# Patient Record
Sex: MALE | Race: WHITE | HISPANIC OR LATINO | RURAL
[De-identification: names, ages, dates, MRNs, and addresses within clinical notes are randomized per-mention and may not be internally consistent; named-entity substitution may affect disease eponyms.]

---

## 2018-07-19 ENCOUNTER — OFFICE VISIT (OUTPATIENT)
Dept: RETAIL CLINIC | Facility: CLINIC | Age: 43
End: 2018-07-19

## 2018-07-19 VITALS
TEMPERATURE: 97.2 F | HEART RATE: 82 BPM | OXYGEN SATURATION: 99 % | HEIGHT: 67 IN | BODY MASS INDEX: 22.76 KG/M2 | RESPIRATION RATE: 14 BRPM | WEIGHT: 145 LBS

## 2018-07-19 DIAGNOSIS — S05.01XA ABRASION OF RIGHT CORNEA, INITIAL ENCOUNTER: Primary | ICD-10-CM

## 2018-07-19 DIAGNOSIS — S05.02XA ABRASION OF LEFT CORNEA, INITIAL ENCOUNTER: ICD-10-CM

## 2018-07-19 DIAGNOSIS — J02.9 PHARYNGITIS, UNSPECIFIED ETIOLOGY: ICD-10-CM

## 2018-07-19 PROCEDURE — 99213 OFFICE O/P EST LOW 20 MIN: CPT | Performed by: NURSE PRACTITIONER

## 2018-07-19 RX ORDER — POLYMYXIN B SULFATE AND TRIMETHOPRIM 1; 10000 MG/ML; [USP'U]/ML
1 SOLUTION OPHTHALMIC EVERY 4 HOURS
Qty: 10 ML | Refills: 0 | Status: SHIPPED | OUTPATIENT
Start: 2018-07-19 | End: 2018-07-26

## 2018-07-19 RX ORDER — GUAIFENESIN 600 MG/1
600 TABLET, EXTENDED RELEASE ORAL 2 TIMES DAILY PRN
Qty: 20 TABLET | Refills: 0 | Status: SHIPPED | OUTPATIENT
Start: 2018-07-19 | End: 2018-07-29

## 2018-07-19 NOTE — PROGRESS NOTES
Maurilio Jerry is a 43 y.o. male.     Pt has had a sore throat and red eyes for the last 48 hours.       Sore Throat    This is a new problem. The current episode started yesterday. The problem has been gradually worsening. Neither side of throat is experiencing more pain than the other. There has been no fever. The pain is moderate. Associated symptoms include congestion, coughing and trouble swallowing. Pertinent negatives include no abdominal pain, diarrhea, drooling, ear discharge, ear pain, headaches, hoarse voice, plugged ear sensation, neck pain, shortness of breath, stridor, swollen glands or vomiting. He has had no exposure to strep or mono. He has tried nothing for the symptoms. The treatment provided no relief.        No current outpatient prescriptions on file prior to visit.     No current facility-administered medications on file prior to visit.        No Known Allergies    History reviewed. No pertinent past medical history.    History reviewed. No pertinent surgical history.    History reviewed. No pertinent family history.    Social History     Social History   • Marital status: Single     Spouse name: N/A   • Number of children: N/A   • Years of education: N/A     Occupational History   • Not on file.     Social History Main Topics   • Smoking status: Never Smoker   • Smokeless tobacco: Never Used   • Alcohol use No      Comment: once a week   • Drug use: No   • Sexual activity: Defer     Other Topics Concern   • Not on file     Social History Narrative   • No narrative on file       Review of Systems   Constitutional: Negative for activity change, appetite change, chills, diaphoresis, fatigue and fever.   HENT: Positive for congestion, sore throat and trouble swallowing. Negative for drooling, ear discharge, ear pain, hoarse voice, rhinorrhea, sinus pain, sinus pressure and voice change.    Eyes: Positive for pain, discharge and redness (right).   Respiratory: Positive for cough.  "Negative for chest tightness, shortness of breath and stridor.    Cardiovascular: Negative for chest pain.   Gastrointestinal: Negative for abdominal pain, diarrhea and vomiting.   Musculoskeletal: Negative for arthralgias and neck pain.   Skin: Negative for rash.   Allergic/Immunologic: Negative for environmental allergies.   Neurological: Negative for dizziness and headaches.   Psychiatric/Behavioral: Negative for agitation.       Pulse 82   Temp 97.2 °F (36.2 °C) (Oral)   Resp 14   Ht 170.2 cm (67\")   Wt 65.8 kg (145 lb)   SpO2 99%   BMI 22.71 kg/m²     Objective   Physical Exam   Constitutional: He is oriented to person, place, and time. He appears well-developed and well-nourished. He is cooperative. He appears ill.   HENT:   Head: Normocephalic and atraumatic.   Right Ear: Tympanic membrane, external ear and ear canal normal.   Left Ear: Tympanic membrane, external ear and ear canal normal.   Nose: Nose normal. Right sinus exhibits no maxillary sinus tenderness and no frontal sinus tenderness. Left sinus exhibits no maxillary sinus tenderness and no frontal sinus tenderness.   Mouth/Throat: Mucous membranes are normal. Posterior oropharyngeal erythema present.   Eyes: Conjunctivae and lids are normal.   Cardiovascular: Normal heart sounds.    Pulmonary/Chest: Effort normal and breath sounds normal.   Abdominal: There is no tenderness.   Lymphadenopathy:     He has cervical adenopathy.   Neurological: He is alert and oriented to person, place, and time.   Skin: Skin is warm and dry. No rash noted.   Psychiatric: He has a normal mood and affect. His speech is normal and behavior is normal.       Procedures None    Assessment/Plan   Diagnoses and all orders for this visit:    Abrasion of right cornea, initial encounter  -     trimethoprim-polymyxin b (POLYTRIM) 72574-0.1 UNIT/ML-% ophthalmic solution; Administer 1 drop to both eyes Every 4 (Four) Hours for 7 days.    Abrasion of left cornea, initial " encounter  -     trimethoprim-polymyxin b (POLYTRIM) 32843-4.1 UNIT/ML-% ophthalmic solution; Administer 1 drop to both eyes Every 4 (Four) Hours for 7 days.    Pharyngitis, unspecified etiology  -     guaiFENesin (MUCINEX) 600 MG 12 hr tablet; Take 1 tablet by mouth 2 (Two) Times a Day As Needed for Cough for up to 10 days.        No results found for this or any previous visit.  -Translater utilized for this visit.   Follow up with PCP or go to the nearest emergency room if symptoms worsen or fail to improve.

## 2018-07-19 NOTE — PATIENT INSTRUCTIONS
Abrasión corneal  Corneal Abrasion  Bryan abrasión corneal es un rasguño o lesión en la cubierta transparente en la parte delantera del rosie (córnea). La córnea es bryan especie de cúpula transparente que protege el rosie y ayuda a fijar la vista. La córnea está formada por muchas capas. La capa más superficial es bryan nadira capa de células llamada epitelio corneal. La córnea es juwan de los tejidos más sensibles del cuerpo. Bryan abrasión corneal puede resultar muy dolorosa.  Si no se trata, puede infectarse y producir bryan úlcera. [place] puede dejar cicatrices. Las cicatrices en la córnea pueden afectar la vista. A veces, las abrasiones pueden volver a aparecer en la misma macario, incluso después de que la lesión original haya cicatrizado (síndrome erosivo recurrente).  ¿Cuáles son las causas?  Esta afección puede ser causada por lo siguiente:  · Un golpe o pinchazo en el rosie.  · Bryan sustancia arenosa o irritante (cuerpo extraño) en el rosie.  · Frotarse el rosie en exceso.  · Ojos muy secos.  · Ciertas infecciones oculares.  · Lentes de contacto que no encajan celena o que se usan elenita períodos prolongados. También puede lastimarse la córnea cuando se pone o se cecy los lentes de contacto.  · Cirugía ocular.    A veces, la causa es desconocida.  ¿Cuáles son los signos o los síntomas?  Los síntomas de esta afección incluyen lo siguiente:  · Dolor en el rosie. El dolor puede empeorar cuando abre o mueve los ojos.  · Sensación de que tiene algo metido en el rosie.  · Dificultad para mantener los ojos abiertos o imposibilidad de mantenerlos abiertos.  · Lagrimeo y enrojecimiento.  · Sensibilidad a la oksana.  · Visión borrosa.  · Dolor de nick.    ¿Cómo se diagnostica?  Esta afección se puede diagnosticar en función de lo siguiente:  · Mckenna antecedentes médicos.  · Mckenna síntomas.  · Un examen ocular. Puede consultar a un especialista en afecciones y enfermedades oculares (oftalmólogo). Antes del examen ocular, es posible que le coloquen gotas  anestésicas dentro del rosie. También es posible que le coloquen bryan sustancia de contraste con un gotero o con bryan pequeña sofia de papel. Con la sustancia de contraste, al oftalmólogo le resulta más fácil rosa la abrasión cuando le examina el rosie con bryan oksana. El oftalmólogo puede examinarle el rosie a través de un oftalmoscopio (lámpara de reneeidura).    ¿Cómo se trata esta afección?  El tratamiento puede variar según la causa de la afección, y puede incluir lo siguiente:  · Lavado del rosie.  · Eliminación de todo cuerpo extraño.  · Gotas o pomadas con antibiótico para tratar bryan infección.  · Gotas o pomadas con corticoesteroides para tratar el enrojecimiento, la irritación o la inflamación.  · Analgésicos.  · Un parche ocular para mantener el rosie cerrado.    Siga estas indicaciones en dennis casa:  Medicamentos  · Use gotas o pomadas oftálmicas según las indicaciones del médico.  · Si le recetaron gotas o pomada con antibiótico, úselas según las indicaciones del médico. No deje de usar el antibiótico aunque comience a sentirse mejor.  · [place] los medicamentos de venta porfirio y los recetados solamente nahid se lo haya indicado el médico.  · No conduzca ni use maquinaria pesada mientras deya analgésicos recetados.  Instrucciones generales  · Si tiene un parche ocular, úselo según las indicaciones del médico.  ? No conduzca ni use maquinaria mientras usa el parche ocular. En estas condiciones no puede juzgar correctamente las distancias.  ? Siga las indicaciones del médico acerca de cuándo quitarse el parche.  · Pregúntele al médico si puede usar un paño frío y húmedo (compresa) en el rosie para aliviar el dolor.  · No se toque ni se frote el rosie. No se lave el rosie.  · No use lentes de contacto hasta que el médico lo autorice.  · Evite la oksana ashkan y la fatiga ocular.  · Concurra a todas las visitas de control nahid se lo haya indicado el médico. [place] es importante para prevenir infecciones y evitar la pérdida de  visión.  Comuníquese con un médico si:  · Continúa con dolor en el rosie y otros síntomas elenita más de 2 días.  · Tiene síntomas nuevos, nahid enrojecimiento, lagrimeo o secreción.  · Tiene bryan secreción que le konstantin los ojos pegados a la mañana.  · El parche ocular se afloja al punto que puede parpadear.  · Los síntomas vuelven a aparecer después de que la abrasión original haya cicatrizado.  Solicite ayuda de inmediato si:  · Tiene dolor intenso en el rosie que no mejora con medicamentos.  · Tiene pérdida de visión.  Resumen  · Bryan abrasión corneal es un rasguño en la cubierta transparente en la parte delantera del rosie (córnea).  · La abrasión corneal puede causar dolor en el rosie, enrojecimiento, lagrimeo o visión borrosa.  · Por lo general, esta afección se trata con medicamentos para prevenir infecciones y evitar que queden cicatrices anormales. También es posible que deba usar un parche ocular para cubrirse el rosie.  · Informe al médico si vickey síntomas continúan elenita más de 2 días.  Esta información no tiene nahid fin reemplazar el consejo del médico. Asegúrese de hacerle al médico cualquier pregunta que tenga.  Document Released: 12/18/2006 Document Revised: 03/21/2018 Document Reviewed: 03/21/2018  [employer] Interactive Patient Education © 2018 [employer]